# Patient Record
Sex: FEMALE | Race: BLACK OR AFRICAN AMERICAN | NOT HISPANIC OR LATINO
[De-identification: names, ages, dates, MRNs, and addresses within clinical notes are randomized per-mention and may not be internally consistent; named-entity substitution may affect disease eponyms.]

---

## 2024-07-29 ENCOUNTER — APPOINTMENT (OUTPATIENT)
Dept: BREAST CENTER | Facility: CLINIC | Age: 34
End: 2024-07-29

## 2024-07-29 ENCOUNTER — NON-APPOINTMENT (OUTPATIENT)
Age: 34
End: 2024-07-29

## 2024-07-29 VITALS — WEIGHT: 200 LBS | BODY MASS INDEX: 29.62 KG/M2 | HEIGHT: 69 IN

## 2024-07-29 DIAGNOSIS — Z80.8 FAMILY HISTORY OF MALIGNANT NEOPLASM OF OTHER ORGANS OR SYSTEMS: ICD-10-CM

## 2024-07-29 DIAGNOSIS — Z91.89 OTHER SPECIFIED PERSONAL RISK FACTORS, NOT ELSEWHERE CLASSIFIED: ICD-10-CM

## 2024-07-29 DIAGNOSIS — Z92.3 PERSONAL HISTORY OF IRRADIATION: ICD-10-CM

## 2024-07-29 DIAGNOSIS — Z92.21 PERSONAL HISTORY OF ANTINEOPLASTIC CHEMOTHERAPY: ICD-10-CM

## 2024-07-29 DIAGNOSIS — D05.11 INTRADUCTAL CARCINOMA IN SITU OF RIGHT BREAST: ICD-10-CM

## 2024-07-29 DIAGNOSIS — Z85.3 PERSONAL HISTORY OF MALIGNANT NEOPLASM OF BREAST: ICD-10-CM

## 2024-07-29 DIAGNOSIS — Z78.9 OTHER SPECIFIED HEALTH STATUS: ICD-10-CM

## 2024-07-29 PROBLEM — Z00.00 ENCOUNTER FOR PREVENTIVE HEALTH EXAMINATION: Status: ACTIVE | Noted: 2024-07-29

## 2024-07-29 PROCEDURE — 99205 OFFICE O/P NEW HI 60 MIN: CPT

## 2024-07-29 RX ORDER — CHROMIUM 200 MCG
TABLET ORAL
Refills: 0 | Status: ACTIVE | COMMUNITY

## 2024-07-29 RX ORDER — ANASTROZOLE TABLETS 1 MG/1
TABLET ORAL
Refills: 0 | Status: ACTIVE | COMMUNITY

## 2024-07-30 PROBLEM — Z85.3 HISTORY OF RIGHT BREAST CANCER: Status: ACTIVE | Noted: 2024-07-30

## 2024-07-30 PROBLEM — Z91.89 AT HIGH RISK FOR BREAST CANCER: Status: ACTIVE | Noted: 2024-07-30

## 2024-08-07 NOTE — ASSESSMENT
[FreeTextEntry1] : The pathology and imaging results were discussed. The patient was advised of the early nature of this malignancy. She is stage 0 DCIS,   The use of multimodality therapy for the treatment of breast cancer was discussed.   Surgical options were reviewed including a lumpectomy to negative margins, with whole breast radiation therapy versus a mastectomy with or without reconstruction.   Mastectomy techniques were discussed in detail including skin sparing and nipple sparing techniques. Reconstructive options were briefly reviewed, including implant based versus tissue flap based reconstruction options. Referral to a plastic surgeon was offered. The patient was informed that breast reconstruction is covered by insurance per state and federal laws.   Axillary staging was discussed. [place] lymph node biopsy was recommended only in the setting of a decision to proceed with a mastectomy, a tumor highly suspicious for invasion, or a subsequent finding of invasive cancer.  Magtrace injection was reviewed. Discussed if microinvasive or invasive cancer is found on pathology then sentinel lymph node biopsy would be recommended. The patient wishes to have sentinel node. Explained to her that if she does not map given her prior history of surgery/SLNE or scarring. She understands that   The general indications for the use of adjuvant hormonal therapies postoperatively were discussed for both treatment and future risk reduction. It was explained that medical treatments are dependent on a patients age and medical problems, and their pathology results including receptor studies. The patient was advised to meet with a medical oncologist once those results are available.   The indications for radiation therapy and side effects were also discussed, including the use varying lengths of whole breast radiation. The patient was advised to meet with a radiation therapist if a lumpectomy is done. Common side effects were reviewed.  The genetics of breast cancer were discussed with the implications for risk of contralateral cancer and other associated cancers, implication for ovarian risk, options for management, and implications for family members.   Mastectomy risks: Risks of mastectomy include bleeding, infection, pain, wound healing problems, necrosis of skin, seroma (fluid buildup), numbness of the chest wall/underarm/to the inner-side of upper arm (expected), shoulder pain and stiffness, recurrence of cancer, progression/spread of cancer, leaving breast cells behind, lymphedema (chronic swelling) of the arm, nerve injury affecting the back/chest muscles, sensitivity to touch within the area of surgery (usually improves as the nerves grow back). This is in addition to medical risks of all procedures not limited to heart attack, strokes, and blood clots.  Questions were answered and written information was provided.

## 2024-08-07 NOTE — ASSESSMENT
[FreeTextEntry1] : The pathology and imaging results were discussed. The patient was advised of the early nature of this malignancy. She is stage 0 DCIS,   The use of multimodality therapy for the treatment of breast cancer was discussed.   Surgical options were reviewed including a lumpectomy to negative margins, with whole breast radiation therapy versus a mastectomy with or without reconstruction.   Mastectomy techniques were discussed in detail including skin sparing and nipple sparing techniques. Reconstructive options were briefly reviewed, including implant based versus tissue flap based reconstruction options. Referral to a plastic surgeon was offered. The patient was informed that breast reconstruction is covered by insurance per state and federal laws.   Axillary staging was discussed. [place] lymph node biopsy was recommended only in the setting of a decision to proceed with a mastectomy, a tumor highly suspicious for invasion, or a subsequent finding of invasive cancer.  Magtrace injection was reviewed. Discussed if microinvasive or invasive cancer is found on pathology then sentinel lymph node biopsy would be recommended. The patient wishes to have sentinel node. Explained to her that if she does not map given her prior history of surgery/SLNE or scarring. She understands that   The general indications for the use of adjuvant hormonal therapies postoperatively were discussed for both treatment and future risk reduction. It was explained that medical treatments are dependent on a patients age and medical problems, and their pathology results including receptor studies. The patient was advised to meet with a medical oncologist once those results are available.   The indications for radiation therapy and side effects were also discussed, including the use varying lengths of whole breast radiation. The patient was advised to meet with a radiation therapist if a lumpectomy is done. Common side effects were reviewed.  The genetics of breast cancer were discussed with the implications for risk of contralateral cancer and other associated cancers, implication for ovarian risk, options for management, and implications for family members.   Mastectomy risks: Risks of mastectomy include bleeding, infection, pain, wound healing problems, necrosis of skin, seroma (fluid buildup), numbness of the chest wall/underarm/to the inner-side of upper arm (expected), shoulder pain and stiffness, recurrence of cancer, progression/spread of cancer, leaving breast cells behind, lymphedema (chronic swelling) of the arm, nerve injury affecting the back/chest muscles, sensitivity to touch within the area of surgery (usually improves as the nerves grow back). This is in addition to medical risks of all procedures not limited to heart attack, strokes, and blood clots.  Questions were answered and written information was provided.

## 2024-08-07 NOTE — PHYSICAL EXAM
[Normocephalic] : normocephalic [Atraumatic] : atraumatic [Supple] : supple [No Supraclavicular Adenopathy] : no supraclavicular adenopathy [Examined in the supine and seated position] : examined in the supine and seated position [Symmetrical] : symmetrical [Grade 3] : Ptosis Grade 3 [No dominant masses] : no dominant masses in right breast  [No dominant masses] : no dominant masses left breast [No Nipple Retraction] : no left nipple retraction [No Nipple Discharge] : no left nipple discharge [No Axillary Lymphadenopathy] : no left axillary lymphadenopathy [No Edema] : no edema [No Rashes] : no rashes [No Ulceration] : no ulceration [de-identified] : healed wise pattern, no masses [de-identified] : lateral aspect of incision is superior oriented [de-identified] : healed axillary incision

## 2024-08-07 NOTE — PHYSICAL EXAM
[Normocephalic] : normocephalic [Atraumatic] : atraumatic [Supple] : supple [No Supraclavicular Adenopathy] : no supraclavicular adenopathy [Examined in the supine and seated position] : examined in the supine and seated position [Symmetrical] : symmetrical [Grade 3] : Ptosis Grade 3 [No dominant masses] : no dominant masses in right breast  [No dominant masses] : no dominant masses left breast [No Nipple Retraction] : no left nipple retraction [No Nipple Discharge] : no left nipple discharge [No Axillary Lymphadenopathy] : no left axillary lymphadenopathy [No Edema] : no edema [No Rashes] : no rashes [No Ulceration] : no ulceration [de-identified] : healed wise pattern, no masses [de-identified] : lateral aspect of incision is superior oriented [de-identified] : healed axillary incision

## 2024-08-07 NOTE — PHYSICAL EXAM
[Normocephalic] : normocephalic [Atraumatic] : atraumatic [Supple] : supple [No Supraclavicular Adenopathy] : no supraclavicular adenopathy [Examined in the supine and seated position] : examined in the supine and seated position [Symmetrical] : symmetrical [Grade 3] : Ptosis Grade 3 [No dominant masses] : no dominant masses in right breast  [No dominant masses] : no dominant masses left breast [No Nipple Retraction] : no left nipple retraction [No Nipple Discharge] : no left nipple discharge [No Axillary Lymphadenopathy] : no left axillary lymphadenopathy [No Edema] : no edema [No Rashes] : no rashes [No Ulceration] : no ulceration [de-identified] : healed wise pattern, no masses [de-identified] : lateral aspect of incision is superior oriented [de-identified] : healed axillary incision

## 2024-08-07 NOTE — HISTORY OF PRESENT ILLNESS
[FreeTextEntry1] : This is a 33 year old female    She does not do SBE She has not noticed a change in her breast or a breast lump. She has not noticed a change in her nipple or nipple area. She has not noticed a change in the skin of the breast. She is not experiencing nipple discharge. She is not experiencing breast pain. She has not noticed a lump or lymph node under the armpit.   BREAST CANCER RISK FACTORS   Menarche: 21 Date of LMP:  Menopause: ?2019 Grav: 1        Para: 1 Age at first live birth: 23 Nursed: Y Hysterectomy: N Oophorectomy: N OCP: Y HRT: N Last pap/pelvic exam: Related family history: N Ashkenazi: N Mastery risk assessment: Genetic testing: Y Bra size: 38C    Last mammogram:    Location: Report reviewed.           Images reviewed. Results: [facility]      Last ultrasound:     Location: Report reviewed.       Images reviewed. Results: [facility]     Last MRI:   Location: Report reviewed.     Images reviewed. Results: [facility]

## 2024-08-07 NOTE — ASSESSMENT
[FreeTextEntry1] : The pathology and imaging results were discussed. The patient was advised of the early nature of this malignancy. She is stage 0 DCIS,   The use of multimodality therapy for the treatment of breast cancer was discussed.   Surgical options were reviewed including a lumpectomy to negative margins, with whole breast radiation therapy versus a mastectomy with or without reconstruction.   Mastectomy techniques were discussed in detail including skin sparing and nipple sparing techniques. Reconstructive options were briefly reviewed, including implant based versus tissue flap based reconstruction options. Referral to a plastic surgeon was offered. The patient was informed that breast reconstruction is covered by insurance per state and federal laws.   Axillary staging was discussed. [place] lymph node biopsy was recommended only in the setting of a decision to proceed with a mastectomy, a tumor highly suspicious for invasion, or a subsequent finding of invasive cancer.  Magtrace injection was reviewed. Discussed if microinvasive or invasive cancer is found on pathology then sentinel lymph node biopsy would be recommended. The patient wishes to have sentinel node. Explained to her that if she does not map given her prior history of surgery/SLNE or scarring. She understands that   The general indications for the use of adjuvant hormonal therapies postoperatively were discussed for both treatment and future risk reduction. It was explained that medical treatments are dependent on a patients age and medical problems, and their pathology results including receptor studies. The patient was advised to meet with a medical oncologist once those results are available.   The indications for radiation therapy and side effects were also discussed, including the use varying lengths of whole breast radiation. The patient was advised to meet with a radiation therapist if a lumpectomy is done. Common side effects were reviewed.  The genetics of breast cancer were discussed with the implications for risk of contralateral cancer and other associated cancers, implication for ovarian risk, options for management, and implications for family members.   Mastectomy risks: Risks of mastectomy include bleeding, infection, pain, wound healing problems, necrosis of skin, seroma (fluid buildup), numbness of the chest wall/underarm/to the inner-side of upper arm (expected), shoulder pain and stiffness, recurrence of cancer, progression/spread of cancer, leaving breast cells behind, lymphedema (chronic swelling) of the arm, nerve injury affecting the back/chest muscles, sensitivity to touch within the area of surgery (usually improves as the nerves grow back). This is in addition to medical risks of all procedures not limited to heart attack, strokes, and blood clots.  Questions were answered and written information was provided.

## 2024-08-15 ENCOUNTER — APPOINTMENT (OUTPATIENT)
Dept: BREAST CENTER | Facility: HOSPITAL | Age: 34
End: 2024-08-15